# Patient Record
Sex: FEMALE | Race: OTHER | HISPANIC OR LATINO | URBAN - METROPOLITAN AREA
[De-identification: names, ages, dates, MRNs, and addresses within clinical notes are randomized per-mention and may not be internally consistent; named-entity substitution may affect disease eponyms.]

---

## 2019-08-26 ENCOUNTER — EMERGENCY (EMERGENCY)
Facility: HOSPITAL | Age: 39
LOS: 0 days | Discharge: HOME | End: 2019-08-26
Attending: EMERGENCY MEDICINE | Admitting: EMERGENCY MEDICINE
Payer: COMMERCIAL

## 2019-08-26 VITALS
TEMPERATURE: 99 F | SYSTOLIC BLOOD PRESSURE: 143 MMHG | OXYGEN SATURATION: 98 % | HEIGHT: 62 IN | HEART RATE: 93 BPM | DIASTOLIC BLOOD PRESSURE: 82 MMHG | RESPIRATION RATE: 20 BRPM | WEIGHT: 154.98 LBS

## 2019-08-26 DIAGNOSIS — N93.9 ABNORMAL UTERINE AND VAGINAL BLEEDING, UNSPECIFIED: ICD-10-CM

## 2019-08-26 PROCEDURE — 99284 EMERGENCY DEPT VISIT MOD MDM: CPT

## 2019-08-26 NOTE — ED PROVIDER NOTE - PHYSICAL EXAMINATION
Physical Exam    Vital Signs: I have reviewed the initial vital signs.  Constitutional: well-nourished, appears stated age, no acute distress  Gastrointestinal: soft, non-tender abdomen, no pulsatile mass, normal bowl sounds  Genitourinary: external genitalia unremarkable, normal appearance of cervix, cervix and adnexa non-tender on bimanual examination; + blood in vaginal canal, no FB or tampoon seen or felt on exam chaperoned by Nurse Anita LILLY and EDT La Ramirez and Dr. Larsen  Integumentary: warm, dry, no rash  Neurologic: awake, alert, cranial nerves II-XII grossly intact, extremities’ motor and sensory functions grossly intact  Psychiatric: appropriate mood, appropriate affect

## 2019-08-26 NOTE — ED PROVIDER NOTE - OBJECTIVE STATEMENT
39 year old female states that she has a tampoon stuck inside of vaginal canal. patient states its been there for the last 2 days and states that she was trying to pull it out and states that its not coming out. patient denies abd pain, nausea, vomiting, diarrhea, fever/chills.

## 2019-08-26 NOTE — ED PROVIDER NOTE - ATTENDING CONTRIBUTION TO CARE
I personally evaluated the patient. I reviewed the Resident’s or Physician Assistant’s note (as assigned above), and agree with the findings and plan except as documented in my note.  Chart reviewed. Currently menstruating, here for stuck tampon. Exam shows no visible or palpable tampon, minimal vaginal bleeding.

## 2019-08-26 NOTE — ED ADULT NURSE NOTE - NSIMPLEMENTINTERV_GEN_ALL_ED
Implemented All Universal Safety Interventions:  [place] to call system. Call bell, personal items and telephone within reach. Instruct patient to call for assistance. Room bathroom lighting operational. Non-slip footwear when patient is off stretcher. Physically safe environment: no spills, clutter or unnecessary equipment. Stretcher in lowest position, wheels locked, appropriate side rails in place.

## 2019-08-26 NOTE — ED PROVIDER NOTE - NS ED ROS FT
Constitutional: (-) fever  Eyes/ENT: (-) blurry vision, (-) epistaxis  Cardiovascular: (-) chest pain, (-) syncope  Respiratory: (-) cough, (-) shortness of breath  Gastrointestinal: (-) vomiting, (-) diarrhea  Musculoskeletal: (-) neck pain, (-) back pain, (-) joint pain  Integumentary: (-) rash, (-) edema

## 2023-01-06 PROBLEM — Z78.9 OTHER SPECIFIED HEALTH STATUS: Chronic | Status: ACTIVE | Noted: 2019-08-26

## 2023-02-16 PROBLEM — Z00.00 ENCOUNTER FOR PREVENTIVE HEALTH EXAMINATION: Status: ACTIVE | Noted: 2023-02-16

## 2023-02-22 ENCOUNTER — APPOINTMENT (OUTPATIENT)
Dept: OBGYN | Facility: CLINIC | Age: 43
End: 2023-02-22

## 2023-03-14 ENCOUNTER — APPOINTMENT (OUTPATIENT)
Dept: OBGYN | Facility: CLINIC | Age: 43
End: 2023-03-14

## 2023-03-20 ENCOUNTER — APPOINTMENT (OUTPATIENT)
Dept: OBGYN | Facility: CLINIC | Age: 43
End: 2023-03-20
Payer: COMMERCIAL

## 2023-03-20 VITALS
TEMPERATURE: 98.1 F | DIASTOLIC BLOOD PRESSURE: 70 MMHG | HEART RATE: 76 BPM | OXYGEN SATURATION: 99 % | SYSTOLIC BLOOD PRESSURE: 102 MMHG | HEIGHT: 62 IN | BODY MASS INDEX: 29.81 KG/M2 | WEIGHT: 162 LBS

## 2023-03-20 DIAGNOSIS — Z11.3 ENCOUNTER FOR SCREENING FOR INFECTIONS WITH A PREDOMINANTLY SEXUAL MODE OF TRANSMISSION: ICD-10-CM

## 2023-03-20 DIAGNOSIS — Z01.419 ENCOUNTER FOR GYNECOLOGICAL EXAMINATION (GENERAL) (ROUTINE) W/OUT ABNORMAL FINDINGS: ICD-10-CM

## 2023-03-20 LAB
BILIRUB UR QL STRIP: NEGATIVE
CLARITY UR: CLEAR
COLLECTION METHOD: NORMAL
GLUCOSE UR-MCNC: NEGATIVE
HCG UR QL: 0.2 EU/DL
HGB UR QL STRIP.AUTO: NEGATIVE
KETONES UR-MCNC: NEGATIVE
LEUKOCYTE ESTERASE UR QL STRIP: NEGATIVE
NITRITE UR QL STRIP: NEGATIVE
PH UR STRIP: 7
PROT UR STRIP-MCNC: NEGATIVE
SP GR UR STRIP: 1.02

## 2023-03-20 PROCEDURE — 99396 PREV VISIT EST AGE 40-64: CPT | Mod: 25

## 2023-03-20 PROCEDURE — 81003 URINALYSIS AUTO W/O SCOPE: CPT | Mod: QW

## 2023-03-20 NOTE — HISTORY OF PRESENT ILLNESS
[FreeTextEntry1] : NEW PATIENT PRESENT FOR ANNUAL GYN EXAM. [Mammogramdate] : 2021 [BreastSonogramDate] : -0- [PapSmeardate] : 2019 [BoneDensityDate] : -0- [ColonoscopyDate] : -0-

## 2023-03-22 LAB — CYTOLOGY CVX/VAG DOC THIN PREP: NORMAL

## 2023-03-27 LAB
C TRACH RRNA SPEC QL NAA+PROBE: NOT DETECTED
HPV HIGH+LOW RISK DNA PNL CVX: NOT DETECTED
N GONORRHOEA RRNA SPEC QL NAA+PROBE: NOT DETECTED
SOURCE AMPLIFICATION: NORMAL
SOURCE AMPLIFICATION: NORMAL
T VAGINALIS RRNA SPEC QL NAA+PROBE: NOT DETECTED

## 2023-03-31 ENCOUNTER — RESULT REVIEW (OUTPATIENT)
Age: 43
End: 2023-03-31

## 2023-03-31 ENCOUNTER — OUTPATIENT (OUTPATIENT)
Dept: OUTPATIENT SERVICES | Facility: HOSPITAL | Age: 43
LOS: 1 days | End: 2023-03-31
Payer: COMMERCIAL

## 2023-03-31 DIAGNOSIS — Z12.31 ENCOUNTER FOR SCREENING MAMMOGRAM FOR MALIGNANT NEOPLASM OF BREAST: ICD-10-CM

## 2023-03-31 PROCEDURE — 77063 BREAST TOMOSYNTHESIS BI: CPT | Mod: 26

## 2023-03-31 PROCEDURE — 77067 SCR MAMMO BI INCL CAD: CPT

## 2023-03-31 PROCEDURE — 77063 BREAST TOMOSYNTHESIS BI: CPT

## 2023-03-31 PROCEDURE — 77067 SCR MAMMO BI INCL CAD: CPT | Mod: 26

## 2023-04-01 DIAGNOSIS — Z12.31 ENCOUNTER FOR SCREENING MAMMOGRAM FOR MALIGNANT NEOPLASM OF BREAST: ICD-10-CM

## 2023-04-03 ENCOUNTER — RESULT REVIEW (OUTPATIENT)
Age: 43
End: 2023-04-03

## 2023-04-03 ENCOUNTER — OUTPATIENT (OUTPATIENT)
Dept: OUTPATIENT SERVICES | Facility: HOSPITAL | Age: 43
LOS: 1 days | End: 2023-04-03
Payer: COMMERCIAL

## 2023-04-03 DIAGNOSIS — R92.8 OTHER ABNORMAL AND INCONCLUSIVE FINDINGS ON DIAGNOSTIC IMAGING OF BREAST: ICD-10-CM

## 2023-04-03 PROCEDURE — G0279: CPT

## 2023-04-03 PROCEDURE — G0279: CPT | Mod: 26

## 2023-04-03 PROCEDURE — 77065 DX MAMMO INCL CAD UNI: CPT | Mod: 26,RT

## 2023-04-03 PROCEDURE — 76642 ULTRASOUND BREAST LIMITED: CPT | Mod: 26,RT

## 2023-04-03 PROCEDURE — 77065 DX MAMMO INCL CAD UNI: CPT | Mod: RT

## 2023-04-03 PROCEDURE — 76642 ULTRASOUND BREAST LIMITED: CPT | Mod: RT

## 2023-04-04 DIAGNOSIS — R92.8 OTHER ABNORMAL AND INCONCLUSIVE FINDINGS ON DIAGNOSTIC IMAGING OF BREAST: ICD-10-CM

## 2023-08-18 ENCOUNTER — OUTPATIENT (OUTPATIENT)
Dept: OUTPATIENT SERVICES | Facility: HOSPITAL | Age: 43
LOS: 1 days | End: 2023-08-18
Payer: COMMERCIAL

## 2023-08-18 ENCOUNTER — LABORATORY RESULT (OUTPATIENT)
Age: 43
End: 2023-08-18

## 2023-08-18 ENCOUNTER — APPOINTMENT (OUTPATIENT)
Dept: HEMATOLOGY ONCOLOGY | Facility: CLINIC | Age: 43
End: 2023-08-18
Payer: COMMERCIAL

## 2023-08-18 VITALS
SYSTOLIC BLOOD PRESSURE: 142 MMHG | WEIGHT: 162 LBS | HEART RATE: 78 BPM | TEMPERATURE: 97.6 F | DIASTOLIC BLOOD PRESSURE: 71 MMHG | HEIGHT: 62 IN | BODY MASS INDEX: 29.81 KG/M2

## 2023-08-18 DIAGNOSIS — Z78.9 OTHER SPECIFIED HEALTH STATUS: ICD-10-CM

## 2023-08-18 DIAGNOSIS — D50.9 IRON DEFICIENCY ANEMIA, UNSPECIFIED: ICD-10-CM

## 2023-08-18 DIAGNOSIS — D64.9 ANEMIA, UNSPECIFIED: ICD-10-CM

## 2023-08-18 PROCEDURE — 83540 ASSAY OF IRON: CPT

## 2023-08-18 PROCEDURE — 80053 COMPREHEN METABOLIC PANEL: CPT

## 2023-08-18 PROCEDURE — 83550 IRON BINDING TEST: CPT

## 2023-08-18 PROCEDURE — 82746 ASSAY OF FOLIC ACID SERUM: CPT

## 2023-08-18 PROCEDURE — 99204 OFFICE O/P NEW MOD 45 MIN: CPT

## 2023-08-18 PROCEDURE — 85027 COMPLETE CBC AUTOMATED: CPT

## 2023-08-18 PROCEDURE — 82728 ASSAY OF FERRITIN: CPT

## 2023-08-18 PROCEDURE — 82607 VITAMIN B-12: CPT

## 2023-08-18 NOTE — REVIEW OF SYSTEMS
[Diarrhea: Grade 0] : Diarrhea: Grade 0 [Muscle Weakness] : muscle weakness [Negative] : Allergic/Immunologic [Hot Flashes] : no hot flashes [de-identified] : around menstrual cycles

## 2023-08-18 NOTE — RESULTS/DATA
[FreeTextEntry1] : outside labs reviewed: dated 4/8/23 WBC 6.1 HGB 9.7 HCT 32.1 ,000 MCV 81.1 serum iron 43 TIBC 442 %sat 10

## 2023-08-18 NOTE — HISTORY OF PRESENT ILLNESS
[de-identified] : 43 year old female referred by her PMD, Dr. Carrillo. She was told her iron levels have been dropping over the last couple of years. She denies ever having taken PO iron. She admits to heavy menstrual periods, lasting 7 days, heavier in the first 2-3 days. She denies any other bleeding or bruising. She has never had GI work up.

## 2023-08-18 NOTE — OB HISTORY
[Definite:  ___ (Date)] : the last menstrual period was [unfilled] [Regular Cycle Intervals] : periods have been regular [Frequency: Q ___ days] : menstrual periods occur approximately every [unfilled] days [Menarche Age: ____] : age at menarche was [unfilled] [___] : Living: [unfilled] [Normal Amount/Duration] : was abnormal

## 2023-08-18 NOTE — ASSESSMENT
[FreeTextEntry1] : 43 year old female referred by PMD for Iron Deficiency Anemia.  Will send labs today: CBC, CMP, FERRITIN, IRON STUDIES, VIT B12, FOLATE Patient instructed to call me next week for results.  Initial treatment of GARCIA was discussed with patient. Recommend starting with PO iron 325 mg daily, as tolerated. Will plan to repeat labs in about 3 months after starting. Full GI workup was also discussed and recommended.  Further recommendations after above results available. All questions answered.

## 2023-08-19 DIAGNOSIS — D64.9 ANEMIA, UNSPECIFIED: ICD-10-CM

## 2023-08-21 LAB
ALBUMIN SERPL ELPH-MCNC: 4.7 G/DL
ALP BLD-CCNC: 67 U/L
ALT SERPL-CCNC: 25 U/L
ANION GAP SERPL CALC-SCNC: 10 MMOL/L
AST SERPL-CCNC: 28 U/L
BILIRUB SERPL-MCNC: 0.4 MG/DL
BUN SERPL-MCNC: 12 MG/DL
CALCIUM SERPL-MCNC: 9.2 MG/DL
CHLORIDE SERPL-SCNC: 103 MMOL/L
CO2 SERPL-SCNC: 24 MMOL/L
CREAT SERPL-MCNC: 0.7 MG/DL
EGFR: 110 ML/MIN/1.73M2
FERRITIN SERPL-MCNC: 5 NG/ML
FOLATE SERPL-MCNC: 16.8 NG/ML
GLUCOSE SERPL-MCNC: 100 MG/DL
HCT VFR BLD CALC: 34.2 %
HGB BLD-MCNC: 10.5 G/DL
IRON SATN MFR SERPL: 14 %
IRON SERPL-MCNC: 62 UG/DL
MCHC RBC-ENTMCNC: 24.2 PG
MCHC RBC-ENTMCNC: 30.7 G/DL
MCV RBC AUTO: 78.8 FL
PLATELET # BLD AUTO: 307 K/UL
PMV BLD: 10.2 FL
POTASSIUM SERPL-SCNC: 4.6 MMOL/L
PROT SERPL-MCNC: 7.1 G/DL
RBC # BLD: 4.34 M/UL
RBC # FLD: 14.9 %
SODIUM SERPL-SCNC: 137 MMOL/L
TIBC SERPL-MCNC: 428 UG/DL
UIBC SERPL-MCNC: 366 UG/DL
VIT B12 SERPL-MCNC: 560 PG/ML
WBC # FLD AUTO: 5.84 K/UL

## 2023-11-10 ENCOUNTER — APPOINTMENT (OUTPATIENT)
Dept: HEMATOLOGY ONCOLOGY | Facility: CLINIC | Age: 43
End: 2023-11-10